# Patient Record
Sex: FEMALE | Race: WHITE | NOT HISPANIC OR LATINO | Employment: UNEMPLOYED | URBAN - METROPOLITAN AREA
[De-identification: names, ages, dates, MRNs, and addresses within clinical notes are randomized per-mention and may not be internally consistent; named-entity substitution may affect disease eponyms.]

---

## 2017-02-20 ENCOUNTER — OFFICE VISIT (OUTPATIENT)
Dept: OBSTETRICS AND GYNECOLOGY | Facility: CLINIC | Age: 32
End: 2017-02-20

## 2017-02-20 VITALS — SYSTOLIC BLOOD PRESSURE: 120 MMHG | DIASTOLIC BLOOD PRESSURE: 80 MMHG | WEIGHT: 179 LBS

## 2017-02-20 DIAGNOSIS — R30.0 DYSURIA: ICD-10-CM

## 2017-02-20 DIAGNOSIS — N94.6 DYSMENORRHEA: ICD-10-CM

## 2017-02-20 DIAGNOSIS — N89.8 VAGINAL DISCHARGE: ICD-10-CM

## 2017-02-20 DIAGNOSIS — N92.1 MENORRHAGIA WITH IRREGULAR CYCLE: ICD-10-CM

## 2017-02-20 DIAGNOSIS — N92.6 IRREGULAR PERIODS: Primary | ICD-10-CM

## 2017-02-20 LAB
BILIRUB UR QL STRIP: NEGATIVE
CLARITY UR: CLEAR
COLOR UR: YELLOW
GLUCOSE UR STRIP-MCNC: NEGATIVE MG/DL
HGB UR QL STRIP.AUTO: NEGATIVE
KETONES UR QL STRIP: NEGATIVE
LEUKOCYTE ESTERASE UR QL STRIP.AUTO: NEGATIVE
NITRITE UR QL STRIP: NEGATIVE
PH UR STRIP.AUTO: 7 [PH] (ref 5–8)
PROT UR QL STRIP: NEGATIVE
SP GR UR STRIP: 1.02 (ref 1–1.03)
UROBILINOGEN UR QL STRIP: NORMAL
WET PREP GENITAL: NEGATIVE

## 2017-02-20 PROCEDURE — 81003 URINALYSIS AUTO W/O SCOPE: CPT | Performed by: OBSTETRICS & GYNECOLOGY

## 2017-02-20 PROCEDURE — 87210 SMEAR WET MOUNT SALINE/INK: CPT | Performed by: OBSTETRICS & GYNECOLOGY

## 2017-02-20 PROCEDURE — 99213 OFFICE O/P EST LOW 20 MIN: CPT | Performed by: OBSTETRICS & GYNECOLOGY

## 2017-02-20 RX ORDER — NORETHINDRONE ACETATE AND ETHINYL ESTRADIOL 1.5-30(21)
1 KIT ORAL DAILY
Qty: 28 TABLET | Refills: 12 | Status: SHIPPED | OUTPATIENT
Start: 2017-02-20 | End: 2017-11-10 | Stop reason: ALTCHOICE

## 2017-02-20 NOTE — PROGRESS NOTES
Subjective   Doris Rock is a 31 y.o. female.     History of Present Illness    PP BTL Sept 2011, abnormal periods since March 2016, D/C may 2016, no improvement  The following portions of the patient's history were reviewed and updated as appropriate: allergies, current medications, past family history, past medical history, past social history, past surgical history and problem list.    Review of Systems   Constitutional: Negative.    Gastrointestinal: Negative.    Genitourinary: Positive for dysuria, frequency, menstrual problem, pelvic pain, urgency, vaginal bleeding and vaginal discharge. Negative for decreased urine volume, difficulty urinating, dyspareunia, enuresis, flank pain, hematuria and vaginal pain.   Musculoskeletal: Negative.    Neurological: Negative.    Psychiatric/Behavioral: The patient is nervous/anxious.        Objective   Physical Exam   Abdominal: Soft. Bowel sounds are normal.   Genitourinary: Pelvic exam was performed with patient supine. No labial fusion. There is no rash, tenderness, lesion or injury on the right labia. There is no rash, tenderness, lesion or injury on the left labia. Uterus is tender. Uterus is not deviated, not enlarged and not fixed. Cervix exhibits no motion tenderness, no discharge and no friability. Right adnexum displays no mass, no tenderness and no fullness. Left adnexum displays no mass, no tenderness and no fullness. No erythema, tenderness or bleeding in the vagina. No foreign body in the vagina. No signs of injury around the vagina. Vaginal discharge found.       Nursing note and vitals reviewed.      Assessment/Plan   Doris was seen today for gynecologic exam.    Diagnoses and all orders for this visit:    Irregular periods  -     norethindrone-ethinyl estradiol-iron (MICROGESTIN FE1.5/30) 1.5-30 MG-MCG tablet; Take 1 tablet by mouth Daily.    Menorrhagia with irregular cycle    Dysmenorrhea  -     norethindrone-ethinyl estradiol-iron (MICROGESTIN FE1.5/30)  1.5-30 MG-MCG tablet; Take 1 tablet by mouth Daily.    Vaginal discharge  -     POC Wet Prep    Dysuria  -     Urinalysis With / Culture If Indicated         Return 3 months    Danny Saeed MD

## 2017-06-02 ENCOUNTER — OFFICE VISIT (OUTPATIENT)
Dept: OBSTETRICS AND GYNECOLOGY | Facility: CLINIC | Age: 32
End: 2017-06-02

## 2017-06-02 VITALS
HEIGHT: 67 IN | DIASTOLIC BLOOD PRESSURE: 70 MMHG | WEIGHT: 172 LBS | SYSTOLIC BLOOD PRESSURE: 120 MMHG | BODY MASS INDEX: 27 KG/M2

## 2017-06-02 DIAGNOSIS — N92.1 MENORRHAGIA WITH IRREGULAR CYCLE: ICD-10-CM

## 2017-06-02 DIAGNOSIS — N94.6 DYSMENORRHEA: ICD-10-CM

## 2017-06-02 DIAGNOSIS — R10.2 PELVIC PAIN: ICD-10-CM

## 2017-06-02 DIAGNOSIS — N92.6 IRREGULAR PERIODS: Primary | ICD-10-CM

## 2017-06-02 PROCEDURE — 99212 OFFICE O/P EST SF 10 MIN: CPT | Performed by: OBSTETRICS & GYNECOLOGY

## 2017-06-02 NOTE — PROGRESS NOTES
Subjective   Doris Rock is a 31 y.o. female.     History of Present Illness   Symptoms did not improve with BCP's, pain continues, constant pain , periods are very heavy, BTL for BC  The following portions of the patient's history were reviewed and updated as appropriate: allergies, current medications, past family history, past medical history, past social history, past surgical history and problem list.    Review of Systems   Constitutional: Negative.    Gastrointestinal: Positive for abdominal pain.   Genitourinary: Positive for dyspareunia, menstrual problem, pelvic pain and vaginal bleeding.   Psychiatric/Behavioral: The patient is nervous/anxious.        Objective   Physical Exam   Abdominal: Soft. Bowel sounds are normal. She exhibits no distension. There is no tenderness. There is no rebound and no guarding. No hernia.       Assessment/Plan   Doris was seen today for gynecologic exam and pelvic pain.    Diagnoses and all orders for this visit:    Irregular periods    Menorrhagia with irregular cycle    Dysmenorrhea    Pelvic pain       Refer to Dr Almaraz for evaluation possible laparoscopy, ablation    Danny Saeed MD

## 2017-10-17 ENCOUNTER — APPOINTMENT (OUTPATIENT)
Dept: CT IMAGING | Facility: HOSPITAL | Age: 32
End: 2017-10-17

## 2017-10-17 ENCOUNTER — HOSPITAL ENCOUNTER (EMERGENCY)
Facility: HOSPITAL | Age: 32
Discharge: HOME | End: 2017-10-17
Attending: EMERGENCY MEDICINE | Admitting: EMERGENCY MEDICINE

## 2017-10-17 VITALS
DIASTOLIC BLOOD PRESSURE: 89 MMHG | BODY MASS INDEX: 28.93 KG/M2 | HEIGHT: 66 IN | SYSTOLIC BLOOD PRESSURE: 150 MMHG | WEIGHT: 180 LBS | HEART RATE: 75 BPM | RESPIRATION RATE: 18 BRPM | OXYGEN SATURATION: 100 % | TEMPERATURE: 99.4 F

## 2017-10-17 DIAGNOSIS — N93.8 DYSFUNCTIONAL UTERINE BLEEDING: ICD-10-CM

## 2017-10-17 DIAGNOSIS — N83.209 CYST OF OVARY, UNSPECIFIED LATERALITY: Primary | ICD-10-CM

## 2017-10-17 LAB
ALBUMIN SERPL-MCNC: 4.2 G/DL (ref 3.2–4.8)
ALBUMIN/GLOB SERPL: 1.2 G/DL (ref 1.5–2.5)
ALP SERPL-CCNC: 46 U/L (ref 25–100)
ALT SERPL W P-5'-P-CCNC: 63 U/L (ref 7–40)
ANION GAP SERPL CALCULATED.3IONS-SCNC: 4 MMOL/L (ref 3–11)
AST SERPL-CCNC: 42 U/L (ref 0–33)
B-HCG UR QL: NEGATIVE
BASOPHILS # BLD AUTO: 0.01 10*3/MM3 (ref 0–0.2)
BASOPHILS NFR BLD AUTO: 0.2 % (ref 0–1)
BILIRUB SERPL-MCNC: 0.4 MG/DL (ref 0.3–1.2)
BILIRUB UR QL STRIP: NEGATIVE
BUN BLD-MCNC: 9 MG/DL (ref 9–23)
BUN/CREAT SERPL: 12.9 (ref 7–25)
CALCIUM SPEC-SCNC: 9.2 MG/DL (ref 8.7–10.4)
CHLORIDE SERPL-SCNC: 107 MMOL/L (ref 99–109)
CLARITY UR: CLEAR
CO2 SERPL-SCNC: 26 MMOL/L (ref 20–31)
COLOR UR: YELLOW
CREAT BLD-MCNC: 0.7 MG/DL (ref 0.6–1.3)
DEPRECATED RDW RBC AUTO: 40.4 FL (ref 37–54)
EOSINOPHIL # BLD AUTO: 0.05 10*3/MM3 (ref 0–0.3)
EOSINOPHIL NFR BLD AUTO: 1 % (ref 0–3)
ERYTHROCYTE [DISTWIDTH] IN BLOOD BY AUTOMATED COUNT: 12.4 % (ref 11.3–14.5)
GFR SERPL CREATININE-BSD FRML MDRD: 98 ML/MIN/1.73
GLOBULIN UR ELPH-MCNC: 3.5 GM/DL
GLUCOSE BLD-MCNC: 102 MG/DL (ref 70–100)
GLUCOSE UR STRIP-MCNC: NEGATIVE MG/DL
HCT VFR BLD AUTO: 39.3 % (ref 34.5–44)
HGB BLD-MCNC: 12.9 G/DL (ref 11.5–15.5)
HGB UR QL STRIP.AUTO: NEGATIVE
IMM GRANULOCYTES # BLD: 0.01 10*3/MM3 (ref 0–0.03)
IMM GRANULOCYTES NFR BLD: 0.2 % (ref 0–0.6)
INTERNAL NEGATIVE CONTROL: NEGATIVE
INTERNAL POSITIVE CONTROL: POSITIVE
KETONES UR QL STRIP: NEGATIVE
KOH PREP NAIL: NORMAL
LEUKOCYTE ESTERASE UR QL STRIP.AUTO: NEGATIVE
LYMPHOCYTES # BLD AUTO: 2.54 10*3/MM3 (ref 0.6–4.8)
LYMPHOCYTES NFR BLD AUTO: 48.9 % (ref 24–44)
Lab: NORMAL
MCH RBC QN AUTO: 29.3 PG (ref 27–31)
MCHC RBC AUTO-ENTMCNC: 32.8 G/DL (ref 32–36)
MCV RBC AUTO: 89.3 FL (ref 80–99)
MONOCYTES # BLD AUTO: 0.43 10*3/MM3 (ref 0–1)
MONOCYTES NFR BLD AUTO: 8.3 % (ref 0–12)
NEUTROPHILS # BLD AUTO: 2.15 10*3/MM3 (ref 1.5–8.3)
NEUTROPHILS NFR BLD AUTO: 41.4 % (ref 41–71)
NITRITE UR QL STRIP: NEGATIVE
PH UR STRIP.AUTO: 6 [PH] (ref 5–8)
PLATELET # BLD AUTO: 154 10*3/MM3 (ref 150–450)
PMV BLD AUTO: 10 FL (ref 6–12)
POTASSIUM BLD-SCNC: 3.5 MMOL/L (ref 3.5–5.5)
PROT SERPL-MCNC: 7.7 G/DL (ref 5.7–8.2)
PROT UR QL STRIP: NEGATIVE
RBC # BLD AUTO: 4.4 10*6/MM3 (ref 3.89–5.14)
SODIUM BLD-SCNC: 137 MMOL/L (ref 132–146)
SP GR UR STRIP: 1.01 (ref 1–1.03)
UROBILINOGEN UR QL STRIP: NORMAL
WBC NRBC COR # BLD: 5.19 10*3/MM3 (ref 3.5–10.8)

## 2017-10-17 PROCEDURE — 25010000002 ONDANSETRON PER 1 MG: Performed by: NURSE PRACTITIONER

## 2017-10-17 PROCEDURE — 96374 THER/PROPH/DIAG INJ IV PUSH: CPT

## 2017-10-17 PROCEDURE — 99283 EMERGENCY DEPT VISIT LOW MDM: CPT

## 2017-10-17 PROCEDURE — 0 IOPAMIDOL 61 % SOLUTION: Performed by: EMERGENCY MEDICINE

## 2017-10-17 PROCEDURE — 87591 N.GONORRHOEAE DNA AMP PROB: CPT | Performed by: NURSE PRACTITIONER

## 2017-10-17 PROCEDURE — P9612 CATHETERIZE FOR URINE SPEC: HCPCS

## 2017-10-17 PROCEDURE — 80053 COMPREHEN METABOLIC PANEL: CPT | Performed by: NURSE PRACTITIONER

## 2017-10-17 PROCEDURE — 85025 COMPLETE CBC W/AUTO DIFF WBC: CPT | Performed by: NURSE PRACTITIONER

## 2017-10-17 PROCEDURE — 87220 TISSUE EXAM FOR FUNGI: CPT | Performed by: NURSE PRACTITIONER

## 2017-10-17 PROCEDURE — 74177 CT ABD & PELVIS W/CONTRAST: CPT

## 2017-10-17 PROCEDURE — 96361 HYDRATE IV INFUSION ADD-ON: CPT

## 2017-10-17 PROCEDURE — 87491 CHLMYD TRACH DNA AMP PROBE: CPT | Performed by: NURSE PRACTITIONER

## 2017-10-17 PROCEDURE — 81003 URINALYSIS AUTO W/O SCOPE: CPT | Performed by: NURSE PRACTITIONER

## 2017-10-17 RX ORDER — ONDANSETRON 2 MG/ML
4 INJECTION INTRAMUSCULAR; INTRAVENOUS ONCE
Status: COMPLETED | OUTPATIENT
Start: 2017-10-17 | End: 2017-10-17

## 2017-10-17 RX ORDER — SODIUM CHLORIDE 0.9 % (FLUSH) 0.9 %
10 SYRINGE (ML) INJECTION AS NEEDED
Status: DISCONTINUED | OUTPATIENT
Start: 2017-10-17 | End: 2017-10-17 | Stop reason: HOSPADM

## 2017-10-17 RX ORDER — IBUPROFEN 800 MG/1
800 TABLET ORAL
Qty: 30 TABLET | Refills: 0 | Status: SHIPPED | OUTPATIENT
Start: 2017-10-17

## 2017-10-17 RX ADMIN — IOPAMIDOL 95 ML: 612 INJECTION, SOLUTION INTRAVENOUS at 19:42

## 2017-10-17 RX ADMIN — ONDANSETRON 4 MG: 2 INJECTION INTRAMUSCULAR; INTRAVENOUS at 19:19

## 2017-10-17 RX ADMIN — SODIUM CHLORIDE 1000 ML: 9 INJECTION, SOLUTION INTRAVENOUS at 19:20

## 2017-10-17 NOTE — ED PROVIDER NOTES
Subjective   HPI Comments: Pt  is a 32-year-old white female that presents emergency Department complaints of vaginal bleeding.  Patient reports that she's had bleeding past 3 days as well as abdominal pain.  Patient rates her pain an 8 out of 10.  Patient complains of nausea, vomiting.  Patient reports she's gone through one pad per hour.  Patient advises that her periods have been irregular since her tubal ligation 6 years ago.  Patient denies any fevers, chills.    Patient is a 32 y.o. female presenting with vaginal bleeding.   History provided by:  Patient  Vaginal Bleeding   Quality:  Bright red  Severity:  Moderate  Timing:  Constant  Progression:  Worsening  Menstrual history:  Irregular  Number of pads used:  1 per hr  Relieved by:  Nothing  Worsened by:  Nothing  Associated symptoms: abdominal pain and nausea    Associated symptoms: no back pain, no dizziness and no fever        Review of Systems   Constitutional: Negative for chills and fever.   Respiratory: Negative for cough and shortness of breath.    Cardiovascular: Negative for chest pain.   Gastrointestinal: Positive for abdominal pain and nausea.   Genitourinary: Positive for vaginal bleeding.   Musculoskeletal: Negative for back pain.   Neurological: Negative for dizziness.   All other systems reviewed and are negative.      History reviewed. No pertinent past medical history.    No Known Allergies    Past Surgical History:   Procedure Laterality Date   • CHOLECYSTECTOMY     • TUBAL ABDOMINAL LIGATION         Family History   Problem Relation Age of Onset   • Brain cancer Father    • No Known Problems Mother    • Ovarian cancer Sister    • Breast cancer Maternal Grandmother    • Colon cancer Neg Hx    • Diabetes Neg Hx        Social History     Social History   • Marital status: Single     Spouse name: N/A   • Number of children: N/A   • Years of education: N/A     Social History Main Topics   • Smoking status: Current Every Day Smoker      Packs/day: 0.50   • Smokeless tobacco: None   • Alcohol use No   • Drug use: Yes     Special: Marijuana   • Sexual activity: Yes     Birth control/ protection: None     Other Topics Concern   • None     Social History Narrative           Objective   Physical Exam   Constitutional: She is oriented to person, place, and time. She appears well-developed and well-nourished. No distress.   HENT:   Head: Normocephalic and atraumatic.   Right Ear: External ear normal.   Left Ear: External ear normal.   Mouth/Throat: Oropharynx is clear and moist.   Eyes: Conjunctivae and EOM are normal.   Neck: Normal range of motion. Neck supple.   Cardiovascular: Normal rate, regular rhythm and normal heart sounds.    Pulmonary/Chest: Effort normal and breath sounds normal. No respiratory distress.   Abdominal: Soft. Bowel sounds are normal. She exhibits no distension. There is tenderness (lower abd).   Genitourinary: Pelvic exam was performed with patient prone. Cervix exhibits no motion tenderness. Right adnexum displays no mass, no tenderness and no fullness. Left adnexum displays no mass, no tenderness and no fullness. There is bleeding in the vagina.   Genitourinary Comments: Chaperone present   Musculoskeletal: Normal range of motion. She exhibits no edema.   Neurological: She is alert and oriented to person, place, and time. No cranial nerve deficit.   Skin: Skin is warm and dry.   Psychiatric: She has a normal mood and affect. Her behavior is normal.   Nursing note and vitals reviewed.      Procedures         ED Course  ED Course   Comment By Time   She does advise her results at this time.  Patient will be discharged home.  Patient to take ibuprofen for pain and inflammation.  Patient to follow up with GYN.  Patient agrees and verbalizes understanding. Luz Short, APRN 10/17 2140        Recent Results (from the past 24 hour(s))   Comprehensive Metabolic Panel    Collection Time: 10/17/17  6:27 PM   Result Value Ref Range     Glucose 102 (H) 70 - 100 mg/dL    BUN 9 9 - 23 mg/dL    Creatinine 0.70 0.60 - 1.30 mg/dL    Sodium 137 132 - 146 mmol/L    Potassium 3.5 3.5 - 5.5 mmol/L    Chloride 107 99 - 109 mmol/L    CO2 26.0 20.0 - 31.0 mmol/L    Calcium 9.2 8.7 - 10.4 mg/dL    Total Protein 7.7 5.7 - 8.2 g/dL    Albumin 4.20 3.20 - 4.80 g/dL    ALT (SGPT) 63 (H) 7 - 40 U/L    AST (SGOT) 42 (H) 0 - 33 U/L    Alkaline Phosphatase 46 25 - 100 U/L    Total Bilirubin 0.4 0.3 - 1.2 mg/dL    eGFR Non African Amer 98 >60 mL/min/1.73    Globulin 3.5 gm/dL    A/G Ratio 1.2 (L) 1.5 - 2.5 g/dL    BUN/Creatinine Ratio 12.9 7.0 - 25.0    Anion Gap 4.0 3.0 - 11.0 mmol/L   CBC Auto Differential    Collection Time: 10/17/17  6:27 PM   Result Value Ref Range    WBC 5.19 3.50 - 10.80 10*3/mm3    RBC 4.40 3.89 - 5.14 10*6/mm3    Hemoglobin 12.9 11.5 - 15.5 g/dL    Hematocrit 39.3 34.5 - 44.0 %    MCV 89.3 80.0 - 99.0 fL    MCH 29.3 27.0 - 31.0 pg    MCHC 32.8 32.0 - 36.0 g/dL    RDW 12.4 11.3 - 14.5 %    RDW-SD 40.4 37.0 - 54.0 fl    MPV 10.0 6.0 - 12.0 fL    Platelets 154 150 - 450 10*3/mm3    Neutrophil % 41.4 41.0 - 71.0 %    Lymphocyte % 48.9 (H) 24.0 - 44.0 %    Monocyte % 8.3 0.0 - 12.0 %    Eosinophil % 1.0 0.0 - 3.0 %    Basophil % 0.2 0.0 - 1.0 %    Immature Grans % 0.2 0.0 - 0.6 %    Neutrophils, Absolute 2.15 1.50 - 8.30 10*3/mm3    Lymphocytes, Absolute 2.54 0.60 - 4.80 10*3/mm3    Monocytes, Absolute 0.43 0.00 - 1.00 10*3/mm3    Eosinophils, Absolute 0.05 0.00 - 0.30 10*3/mm3    Basophils, Absolute 0.01 0.00 - 0.20 10*3/mm3    Immature Grans, Absolute 0.01 0.00 - 0.03 10*3/mm3   Urinalysis With / Culture If Indicated - Urine, Catheter    Collection Time: 10/17/17  7:29 PM   Result Value Ref Range    Color, UA Yellow Yellow, Straw    Appearance, UA Clear Clear    pH, UA 6.0 5.0 - 8.0    Specific Gravity, UA 1.014 1.001 - 1.030    Glucose, UA Negative Negative    Ketones, UA Negative Negative    Bilirubin, UA Negative Negative    Blood, UA  "Negative Negative    Protein, UA Negative Negative    Leuk Esterase, UA Negative Negative    Nitrite, UA Negative Negative    Urobilinogen, UA 1.0 E.U./dL 0.2 - 1.0 E.U./dL   POCT Pregnancy, urine    Collection Time: 10/17/17  7:34 PM   Result Value Ref Range    HCG, Urine, QL Negative Negative    Lot Number [id number]     Internal Positive Control Positive     Internal Negative Control Negative    KOH Prep - Swab, Cervix    Collection Time: 10/17/17  9:13 PM   Result Value Ref Range    KOH Prep No yeast or hyphal elements seen No yeast or hyphal elements seen     Note: In addition to lab results from this visit, the labs listed above may include labs taken at another facility or during a different encounter within the last 24 hours. Please correlate lab times with ED admission and discharge times for further clarification of the services performed during this visit.    CT Abdomen Pelvis With Contrast   Final Result     Uterus demonstrates mildly prominent endometrial hypodensity, ovarian    cysts/follicles.   Followup with sonography may be considered if clinically    indicated.        Likely apparent wall thickening of nondistended stomach although possibility    of gastritis/peptic ulcer disease cannot be excluded.         Other nonemergent findings as described.          THIS DOCUMENT HAS BEEN ELECTRONICALLY SIGNED BY DARLIN KRUGER MD        Vitals:    10/17/17 1805 10/17/17 1815 10/17/17 1934 10/17/17 2040   BP: 152/86 150/89     BP Location: Left arm      Patient Position: Sitting      Pulse: 75      Resp: 18      Temp: 99.4 °F (37.4 °C)      TempSrc: Oral      SpO2: 100% 100% 100% 100%   Weight: 180 lb (81.6 kg)      Height: 66\" (167.6 cm)        Medications   sodium chloride 0.9 % flush 10 mL (not administered)   sodium chloride 0.9 % bolus 1,000 mL (0 mL Intravenous Stopped 10/17/17 2045)   ondansetron (ZOFRAN) injection 4 mg (4 mg Intravenous Given 10/17/17 1919)   iopamidol (ISOVUE-300) 61 % injection 100 " mL (95 mL Intravenous Given 10/17/17 1942)     ECG/EMG Results (last 24 hours)     ** No results found for the last 24 hours. **                  [facility]    Final diagnoses:   Cyst of ovary, unspecified laterality   Dysfunctional uterine bleeding            Luz LINDA Short, APRN  10/17/17 9919

## 2017-10-20 LAB
C TRACH RRNA SPEC DONR QL NAA+PROBE: NEGATIVE
N GONORRHOEA DNA SPEC QL NAA+PROBE: NEGATIVE

## 2017-11-01 ENCOUNTER — OFFICE VISIT (OUTPATIENT)
Dept: OBSTETRICS AND GYNECOLOGY | Facility: CLINIC | Age: 32
End: 2017-11-01

## 2017-11-01 VITALS
BODY MASS INDEX: 32.44 KG/M2 | SYSTOLIC BLOOD PRESSURE: 120 MMHG | WEIGHT: 201 LBS | HEART RATE: 68 BPM | DIASTOLIC BLOOD PRESSURE: 80 MMHG

## 2017-11-01 DIAGNOSIS — R10.2 PELVIC PAIN: Primary | ICD-10-CM

## 2017-11-01 PROCEDURE — 99213 OFFICE O/P EST LOW 20 MIN: CPT | Performed by: OBSTETRICS & GYNECOLOGY

## 2017-11-01 NOTE — PROGRESS NOTES
Subjective   Doris Rock is a 32 y.o. female.     History of Present Illness  Patient is seen again for abdominal pain and was worked up in the emergency room. CT was inclusive. Patient was scheduled to see Dr Almaraz but did not keep appointment.  Patient reports having chills, nausea vomiting, diarrhea, frequency, and burning on urination.  UA in the ED was normal.      The following portions of the patient's history were reviewed and updated as appropriate: allergies, current medications, past family history, past medical history, past social history, past surgical history and problem list.    Review of Systems   Constitutional: Positive for chills.   Gastrointestinal: Positive for abdominal pain, diarrhea, nausea and vomiting. Negative for abdominal distention, anal bleeding, blood in stool, constipation and rectal pain.   Genitourinary: Positive for difficulty urinating, dyspareunia, dysuria, frequency, pelvic pain and urgency. Negative for decreased urine volume, hematuria, vaginal bleeding, vaginal discharge and vaginal pain.   Psychiatric/Behavioral: The patient is nervous/anxious.        Objective   Physical Exam   Constitutional: She appears well-developed and well-nourished.   Abdominal: Soft. Bowel sounds are normal. She exhibits no distension and no mass. There is no tenderness. There is no rebound and no guarding. No hernia.   Genitourinary: Pelvic exam was performed with patient supine. No labial fusion. There is no rash, tenderness, lesion or injury on the right labia. There is no rash, tenderness, lesion or injury on the left labia. Uterus is not deviated, not enlarged, not fixed and not tender. Cervix exhibits no motion tenderness, no discharge and no friability. Right adnexum displays no mass, no tenderness and no fullness. Left adnexum displays no mass, no tenderness and no fullness. No erythema, tenderness or bleeding in the vagina. No foreign body in the vagina. No signs of injury around the  vagina. Vaginal discharge found.   Nursing note and vitals reviewed.      Assessment/Plan   Doris was seen today for gynecologic exam.    Diagnoses and all orders for this visit:    Pelvic pain  -     US Non-ob Transvaginal       Refer to Dr. Julián Saeed MD

## 2017-11-10 ENCOUNTER — OFFICE VISIT (OUTPATIENT)
Dept: OBSTETRICS AND GYNECOLOGY | Facility: CLINIC | Age: 32
End: 2017-11-10

## 2017-11-10 VITALS
DIASTOLIC BLOOD PRESSURE: 78 MMHG | HEART RATE: 73 BPM | OXYGEN SATURATION: 98 % | BODY MASS INDEX: 31.83 KG/M2 | RESPIRATION RATE: 16 BRPM | WEIGHT: 197.2 LBS | SYSTOLIC BLOOD PRESSURE: 110 MMHG

## 2017-11-10 DIAGNOSIS — R10.2 CHRONIC PELVIC PAIN IN FEMALE: Primary | ICD-10-CM

## 2017-11-10 DIAGNOSIS — R30.9 PAIN WITH URINATION: ICD-10-CM

## 2017-11-10 DIAGNOSIS — G89.29 CHRONIC PELVIC PAIN IN FEMALE: Primary | ICD-10-CM

## 2017-11-10 LAB
BILIRUB BLD-MCNC: ABNORMAL MG/DL
CLARITY, POC: ABNORMAL
COLOR UR: YELLOW
GLUCOSE UR STRIP-MCNC: NEGATIVE MG/DL
KETONES UR QL: ABNORMAL
LEUKOCYTE EST, POC: ABNORMAL
NITRITE UR-MCNC: NEGATIVE MG/ML
PH UR: 5 [PH] (ref 5–8)
PROT UR STRIP-MCNC: NEGATIVE MG/DL
RBC # UR STRIP: NEGATIVE /UL
SP GR UR: 1.02 (ref 1–1.03)
UROBILINOGEN UR QL: ABNORMAL

## 2017-11-10 PROCEDURE — 99214 OFFICE O/P EST MOD 30 MIN: CPT | Performed by: OBSTETRICS & GYNECOLOGY

## 2017-11-10 PROCEDURE — 87086 URINE CULTURE/COLONY COUNT: CPT | Performed by: OBSTETRICS & GYNECOLOGY

## 2017-11-10 NOTE — PROGRESS NOTES
Subjective   Chief Complaint   Patient presents with   • Follow-up     Severe stomach pain 8/10 mainly when it's time for menses x 4-5 months     Doris Rock is a 32 y.o. year old .  Patient's last menstrual period was 10/12/2017 (approximate).  She presents to be seen because of Chronic pelvic pain.  Patient describes the pain as having lasted for approximately 8 months.  It is in a bandlike distribution across the bottom of her abdomen and slightly worse on the left than the right.  She states that the pain is mostly during menses and several days before and after her bleeding.  She also notes dyspareunia with deep penetration.  She has never had issues like this in the past and she denies any alleviating or exacerbating factors outside of menses    OTHER COMPLAINTS:  Nothing else    The following portions of the patient's history were reviewed and updated as appropriate:current medications and allergies    Smoking status: Current Every Day Smoker                                                   Packs/day: 0.50      Years: 0.00      Smokeless status: Never Used                        Review of Systems  Constitutional POS: nothing reported    NEG: anorexia or night sweats   Gastointestinal POS: nothing reported    NEG: bloating, change in bowel habits, melena or reflux symptoms   Genitourinary POS: see HPI    NEG: dysuria or hematuria   Integument POS: nothing reported    NEG: moles that are changing in size, shape, color or rashes   Breast POS: nothing reported    NEG: persistent breast lump, skin dimpling or nipple discharge         Objective   /78  Pulse 73  Resp 16  Wt 197 lb 3.2 oz (89.4 kg)  LMP 10/12/2017 (Approximate)  SpO2 98%  Breastfeeding? No  BMI 31.83 kg/m2    General:  well developed; well nourished  no acute distress   Skin:  No suspicious lesions seen   Thyroid: not examined   Lungs:  breathing is unlabored   Heart:  Regular rate and rhythm    Breasts:  Not performed.    Abdomen: soft, non-tender; no masses  no umbilical or inginual hernias are present  no hepato-splenomegaly   Pelvis: Clinical staff was present for exam  External genitalia:  normal appearance of the external genitalia including Bartholin's and Agricola's glands.  :  urethral meatus normal;  Vaginal:  normal pink mucosa without prolapse or lesions.  Cervix:  cervical motion tenderness is present;  Uterus:  normal size, shape and consistency. tenderness to palpation is present;  Adnexa:  tenderness of the bilateral adnexa to  superficial and deep palpation;     Lab Review   No data reviewed    Imaging   No data reviewed        Assessment   1. Chronic pelvic pain     Plan   1. Given patient's symptoms are highly suspicious for endometriosis, options for evaluation were given.  Patient opts for diagnostic laparoscopy with possible fulguration of endometriosis implants.  Will schedule.      No orders of the defined types were placed in this encounter.         This note was electronically signed.    Nahum Gallego M.D. YUKO  November 10, 2017

## 2017-11-15 LAB — BACTERIA SPEC AEROBE CULT: NORMAL

## 2017-11-16 ENCOUNTER — OUTSIDE FACILITY SERVICE (OUTPATIENT)
Dept: OBSTETRICS AND GYNECOLOGY | Facility: CLINIC | Age: 32
End: 2017-11-16

## 2017-11-16 PROCEDURE — 58662 LAPAROSCOPY EXCISE LESIONS: CPT | Performed by: OBSTETRICS & GYNECOLOGY

## 2017-12-04 ENCOUNTER — TELEPHONE (OUTPATIENT)
Dept: OBSTETRICS AND GYNECOLOGY | Facility: CLINIC | Age: 32
End: 2017-12-04

## 2017-12-04 NOTE — TELEPHONE ENCOUNTER
Patient was advised that she needs to contact PCP as soon as we hang up to get an appointment, or she needs to see [facility] for evaluation of chest pain.  I told her that this should not be related to her recent surgery, and it was an urgent problem that needed to be addressed today.  She voiced understanding and stated that she would call PCP when our call ended.

## 2017-12-04 NOTE — TELEPHONE ENCOUNTER
Dr Gallego pt had laparoscoy recently and twice since she has had a feeling in chest that is a really bad pain, and feels like chest gets tight, cold sweat.  It just happened for the second time this morning.  Comes on suddenly, stopped taking the pain medicine about a week after her surgery, she thought it was that , until this morning it happened again.  No history of heart issues.

## 2022-07-26 ENCOUNTER — OFFICE VISIT (OUTPATIENT)
Dept: PSYCHIATRY | Facility: CLINIC | Age: 37
End: 2022-07-26

## 2022-07-26 DIAGNOSIS — F43.22 ADJUSTMENT DISORDER WITH ANXIOUS MOOD: Primary | ICD-10-CM

## 2022-07-26 PROCEDURE — 90785 PSYTX COMPLEX INTERACTIVE: CPT | Performed by: COUNSELOR

## 2022-07-26 PROCEDURE — 90791 PSYCH DIAGNOSTIC EVALUATION: CPT | Performed by: COUNSELOR
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
## 2022-10-05 ENCOUNTER — HOSPITAL ENCOUNTER (EMERGENCY)
Facility: HOSPITAL | Age: 37
Discharge: HOME | End: 2022-10-05
Attending: EMERGENCY MEDICINE | Admitting: EMERGENCY MEDICINE

## 2022-10-05 VITALS
WEIGHT: 140 LBS | SYSTOLIC BLOOD PRESSURE: 174 MMHG | BODY MASS INDEX: 22.5 KG/M2 | HEART RATE: 89 BPM | HEIGHT: 66 IN | TEMPERATURE: 97.7 F | OXYGEN SATURATION: 100 % | RESPIRATION RATE: 16 BRPM | DIASTOLIC BLOOD PRESSURE: 113 MMHG

## 2022-10-05 DIAGNOSIS — G56.03 BILATERAL CARPAL TUNNEL SYNDROME: Primary | ICD-10-CM

## 2022-10-05 PROCEDURE — 25010000002 KETOROLAC TROMETHAMINE PER 15 MG: Performed by: PHYSICIAN ASSISTANT

## 2022-10-05 PROCEDURE — 99283 EMERGENCY DEPT VISIT LOW MDM: CPT

## 2022-10-05 PROCEDURE — 96372 THER/PROPH/DIAG INJ SC/IM: CPT

## 2022-10-05 RX ORDER — KETOROLAC TROMETHAMINE 30 MG/ML
60 INJECTION, SOLUTION INTRAMUSCULAR; INTRAVENOUS ONCE
Status: COMPLETED | OUTPATIENT
Start: 2022-10-05 | End: 2022-10-05

## 2022-10-05 RX ORDER — NAPROXEN 500 MG/1
500 TABLET ORAL EVERY 12 HOURS PRN
Qty: 30 TABLET | Refills: 0 | Status: SHIPPED | OUTPATIENT
Start: 2022-10-05

## 2022-10-05 RX ADMIN — KETOROLAC TROMETHAMINE 60 MG: 30 INJECTION, SOLUTION INTRAMUSCULAR at 10:00

## 2022-10-05 RX ADMIN — DICLOFENAC 4 G: 10 GEL TOPICAL at 10:16

## 2022-10-05 NOTE — DISCHARGE INSTRUCTIONS
Wear wrist braces is much as possible.  Rest, ice, and elevate the extremities at home.  Take naproxen as needed as prescribed.  Use Voltaren gel as needed as prescribed.  Take Tylenol as needed per directions on the package.  Follow-up with your PCP and orthopedic specialist for further outpatient evaluation if symptoms persist.  Return to the ER for new or worsening symptoms or acute concerns.

## 2022-10-05 NOTE — ED PROVIDER NOTES
Subjective   History of Present Illness   Patient is a 37-year-old female presenting to the ER with complaints of bilateral wrist pain and tingling.  Patient works as a  and states that she does use her hands a lot.  She denies any specific injury.  She states she is able to move the wrist but it is painful.  She states she has been taking Tylenol over-the-counter.  She denies additional medications or interventions prior to arrival.  She denies additional symptoms or complaints at this time.    Review of Systems   Musculoskeletal:        Bilateral wrist pain and paresthesias   All other systems reviewed and are negative.      No past medical history on file.    No Known Allergies    Past Surgical History:   Procedure Laterality Date   • CHOLECYSTECTOMY     • TUBAL ABDOMINAL LIGATION         Family History   Problem Relation Age of Onset   • Brain cancer Father    • No Known Problems Mother    • Ovarian cancer Sister    • Breast cancer Maternal Grandmother    • Colon cancer Neg Hx    • Diabetes Neg Hx        Social History     Socioeconomic History   • Marital status: Single   Tobacco Use   • Smoking status: Current Every Day Smoker     Packs/day: 0.50   • Smokeless tobacco: Never Used   Substance and Sexual Activity   • Alcohol use: No   • Drug use: Yes     Types: Marijuana   • Sexual activity: Yes     Birth control/protection: Surgical     Comment: Tubal           Objective   Physical Exam  Vitals and nursing note reviewed.   Constitutional:       General: She is not in acute distress.     Appearance: She is not toxic-appearing.   HENT:      Head: Normocephalic and atraumatic.      Right Ear: External ear normal.      Left Ear: External ear normal.      Nose: Nose normal.   Eyes:      Extraocular Movements: Extraocular movements intact.      Conjunctiva/sclera: Conjunctivae normal.   Cardiovascular:      Rate and Rhythm: Normal rate.   Pulmonary:      Effort: Pulmonary effort is normal. No respiratory  distress.   Abdominal:      General: There is no distension.      Palpations: Abdomen is soft.   Musculoskeletal:      Cervical back: Normal range of motion and neck supple.      Comments: Range of motion intact but painful, positive Tinel and Phalen's test, 2+ pulses, no signs of injury, sensation intact   Skin:     General: Skin is warm and dry.   Neurological:      General: No focal deficit present.      Mental Status: She is alert and oriented to person, place, and time.   Psychiatric:         Mood and Affect: Mood normal.         Behavior: Behavior normal.         Procedures           ED Course                                           MDM   Patient was evaluated in the ER for bilateral wrist pain and paresthesias with no known injury.  Patient is hypertensive but otherwise hemodynamically stable and nontoxic-appearing on exam.  No recent injury.  No signs of acute injury on exam.  Patient does have positive Phalen's and Tinel's test and presentation is consistent with carpal tunnel syndrome.  Given this, patient was given a shot of Toradol, ice pack, and bilateral wrist braces.  She was given Voltaren gel to use as needed.  Prescription was sent for naproxen.  RICE therapy was advised.  Patient was given information for Dr. Shen, orthopedic specialist, for further outpatient evaluation if symptoms persist.  Precautions were given for return to the ER for any new or worsening symptoms.    Final diagnoses:   Bilateral carpal tunnel syndrome       ED Disposition  ED Disposition     ED Disposition   Discharge    Condition   Stable    Comment   --             Danny Saeed MD  [address] [facility] [address]  [facility] [address] [phone]    Call   As needed    Danny Shen MD  [address] [address] [phone]    Schedule an appointment as soon as possible for a visit   for further outpatient evaluation of bilateral wrist pain and paresthesia    [facility]  Department  [address] [facility] [address] [phone]  Go to   As needed, If symptoms worsen         Medication List      New Prescriptions    naproxen 500 MG tablet  Commonly known as: NAPROSYN  Take 1 tablet by mouth Every 12 (Twelve) Hours As Needed for Mild Pain or Moderate Pain.           Where to Get Your Medications      [id number] - [address] AT [facility] & [address]  - [phone]  - [phone] [address] [address]    Phone: [phone]   · naproxen 500 MG tablet          Anna Lr PAMaryC  10/05/22 1006

## 2024-03-03 ENCOUNTER — APPOINTMENT (OUTPATIENT)
Dept: ULTRASOUND IMAGING | Facility: HOSPITAL | Age: 39
End: 2024-03-03
Payer: COMMERCIAL

## 2024-03-03 ENCOUNTER — HOSPITAL ENCOUNTER (EMERGENCY)
Facility: HOSPITAL | Age: 39
Discharge: HOME | End: 2024-03-03
Attending: EMERGENCY MEDICINE | Admitting: EMERGENCY MEDICINE
Payer: COMMERCIAL

## 2024-03-03 VITALS
SYSTOLIC BLOOD PRESSURE: 161 MMHG | HEIGHT: 67 IN | WEIGHT: 170 LBS | OXYGEN SATURATION: 100 % | HEART RATE: 70 BPM | RESPIRATION RATE: 18 BRPM | DIASTOLIC BLOOD PRESSURE: 96 MMHG | TEMPERATURE: 97.8 F | BODY MASS INDEX: 26.68 KG/M2

## 2024-03-03 DIAGNOSIS — N92.6 IRREGULAR MENSTRUAL BLEEDING: Primary | ICD-10-CM

## 2024-03-03 DIAGNOSIS — D25.9 UTERINE LEIOMYOMA, UNSPECIFIED LOCATION: ICD-10-CM

## 2024-03-03 LAB
ABO GROUP BLD: NORMAL
BASOPHILS # BLD AUTO: 0.02 10*3/MM3 (ref 0–0.2)
BASOPHILS NFR BLD AUTO: 0.6 % (ref 0–1.5)
DEPRECATED RDW RBC AUTO: 41.7 FL (ref 37–54)
EOSINOPHIL # BLD AUTO: 0.06 10*3/MM3 (ref 0–0.4)
EOSINOPHIL NFR BLD AUTO: 1.7 % (ref 0.3–6.2)
ERYTHROCYTE [DISTWIDTH] IN BLOOD BY AUTOMATED COUNT: 13.2 % (ref 12.3–15.4)
HCG INTACT+B SERPL-ACNC: <0.1 MIU/ML
HCT VFR BLD AUTO: 39.7 % (ref 34–46.6)
HGB BLD-MCNC: 12.7 G/DL (ref 12–15.9)
HOLD SPECIMEN: NORMAL
IMM GRANULOCYTES # BLD AUTO: 0.01 10*3/MM3 (ref 0–0.05)
IMM GRANULOCYTES NFR BLD AUTO: 0.3 % (ref 0–0.5)
LYMPHOCYTES # BLD AUTO: 1.11 10*3/MM3 (ref 0.7–3.1)
LYMPHOCYTES NFR BLD AUTO: 31.4 % (ref 19.6–45.3)
MCH RBC QN AUTO: 27.9 PG (ref 26.6–33)
MCHC RBC AUTO-ENTMCNC: 32 G/DL (ref 31.5–35.7)
MCV RBC AUTO: 87.1 FL (ref 79–97)
MONOCYTES # BLD AUTO: 0.3 10*3/MM3 (ref 0.1–0.9)
MONOCYTES NFR BLD AUTO: 8.5 % (ref 5–12)
NEUTROPHILS NFR BLD AUTO: 2.03 10*3/MM3 (ref 1.7–7)
NEUTROPHILS NFR BLD AUTO: 57.5 % (ref 42.7–76)
NRBC BLD AUTO-RTO: 0 /100 WBC (ref 0–0.2)
NUMBER OF DOSES: NORMAL
PLATELET # BLD AUTO: 194 10*3/MM3 (ref 140–450)
PMV BLD AUTO: 10.4 FL (ref 6–12)
RBC # BLD AUTO: 4.56 10*6/MM3 (ref 3.77–5.28)
RH BLD: POSITIVE
WBC NRBC COR # BLD AUTO: 3.53 10*3/MM3 (ref 3.4–10.8)
WHOLE BLOOD HOLD COAG: NORMAL
WHOLE BLOOD HOLD SPECIMEN: NORMAL

## 2024-03-03 PROCEDURE — 25010000002 ONDANSETRON PER 1 MG: Performed by: PHYSICIAN ASSISTANT

## 2024-03-03 PROCEDURE — 86901 BLOOD TYPING SEROLOGIC RH(D): CPT | Performed by: EMERGENCY MEDICINE

## 2024-03-03 PROCEDURE — 99284 EMERGENCY DEPT VISIT MOD MDM: CPT

## 2024-03-03 PROCEDURE — 85025 COMPLETE CBC W/AUTO DIFF WBC: CPT | Performed by: EMERGENCY MEDICINE

## 2024-03-03 PROCEDURE — 25810000003 SODIUM CHLORIDE 0.9 % SOLUTION: Performed by: PHYSICIAN ASSISTANT

## 2024-03-03 PROCEDURE — 76817 TRANSVAGINAL US OBSTETRIC: CPT

## 2024-03-03 PROCEDURE — 84702 CHORIONIC GONADOTROPIN TEST: CPT | Performed by: EMERGENCY MEDICINE

## 2024-03-03 PROCEDURE — 96374 THER/PROPH/DIAG INJ IV PUSH: CPT

## 2024-03-03 PROCEDURE — 86900 BLOOD TYPING SEROLOGIC ABO: CPT | Performed by: EMERGENCY MEDICINE

## 2024-03-03 RX ORDER — ONDANSETRON 2 MG/ML
4 INJECTION INTRAMUSCULAR; INTRAVENOUS ONCE
Status: COMPLETED | OUTPATIENT
Start: 2024-03-03 | End: 2024-03-03

## 2024-03-03 RX ORDER — SODIUM CHLORIDE 0.9 % (FLUSH) 0.9 %
10 SYRINGE (ML) INJECTION AS NEEDED
Status: DISCONTINUED | OUTPATIENT
Start: 2024-03-03 | End: 2024-03-03 | Stop reason: HOSPADM

## 2024-03-03 RX ADMIN — ONDANSETRON 4 MG: 2 INJECTION INTRAMUSCULAR; INTRAVENOUS at 09:46

## 2024-03-03 RX ADMIN — SODIUM CHLORIDE 1000 ML: 9 INJECTION, SOLUTION INTRAVENOUS at 09:46

## 2024-03-03 NOTE — ED PROVIDER NOTES
"Subjective   History of Present Illness  Ms. Rock is a 38-year-old female with no known health issues who presents to the emergency department with complaints of vaginal bleeding and a positive home pregnancy test.  The patient states that she took a home pregnancy test 3 days ago that was positive.  She began having some cramping last night and then began light spotting last night.  This morning, she states that her bleeding was much heavier and she was passing clots and \"chunks\" of tissue.  She is not using any pads or tampons but states that she has but folds of toilet paper in her panties to catch the bleeding.  She is having some nausea and has vomited a couple of times.  Pregnancy history is G5, P4, AB 0.  She has not seen an OB doctor during this pregnancy as of yet.  She has plans to see Dr. Dominique Ambrose.  She has had previous cholecystectomy.  No other known health issues.      Review of Systems   Constitutional:  Negative for chills and fever.   HENT:  Negative for sore throat.    Respiratory:  Negative for cough and shortness of breath.    Cardiovascular:  Negative for chest pain.   Gastrointestinal:  Positive for abdominal pain (crampy), nausea and vomiting.   Genitourinary:  Positive for vaginal bleeding. Negative for dysuria.   Musculoskeletal:  Negative for back pain.   Skin:  Negative for pallor.   Neurological:  Negative for light-headedness and headaches.   Hematological: Negative.    Psychiatric/Behavioral: Negative.         History reviewed. No pertinent past medical history.    No Known Allergies    Past Surgical History:   Procedure Laterality Date    CHOLECYSTECTOMY      TUBAL ABDOMINAL LIGATION         Family History   Problem Relation Age of Onset    Brain cancer Father     No Known Problems Mother     Ovarian cancer Sister     Breast cancer Maternal Grandmother     Colon cancer Neg Hx     Diabetes Neg Hx        Social History     Socioeconomic History    Marital status: Single   Tobacco Use " "   Smoking status: Every Day     Current packs/day: 0.50     Types: Cigarettes     Passive exposure: Never    Smokeless tobacco: Never   Vaping Use    Vaping status: Never Used   Substance and Sexual Activity    Alcohol use: No    Drug use: Yes     Types: Marijuana    Sexual activity: Yes     Birth control/protection: Surgical     Comment: Tubal           Objective   Physical Exam  Constitutional:       General: She is not in acute distress.     Appearance: Normal appearance. She is not ill-appearing or diaphoretic.   HENT:      Head: Normocephalic.      Nose: Nose normal.      Mouth/Throat:      Mouth: Mucous membranes are moist.   Eyes:      Conjunctiva/sclera: Conjunctivae normal.      Pupils: Pupils are equal, round, and reactive to light.   Cardiovascular:      Rate and Rhythm: Normal rate and regular rhythm.      Pulses: Normal pulses.   Pulmonary:      Effort: Pulmonary effort is normal. No respiratory distress.      Breath sounds: Normal breath sounds.   Abdominal:      Tenderness: There is no abdominal tenderness. There is no guarding.   Musculoskeletal:      Cervical back: Neck supple.      Right lower leg: No edema.      Left lower leg: No edema.   Skin:     General: Skin is warm and dry.      Coloration: Skin is not pale.   Neurological:      Mental Status: She is alert and oriented to person, place, and time.   Psychiatric:         Mood and Affect: Mood normal.         Procedures           ED Course      In summary, healthy 38-year-old female with positive home pregnancy test 3 days ago, presents with pelvic cramping and vaginal bleeding that began late last night, early this morning.  She states that she is passing clots and \"chunks\" of tissue.  She is not using any pads but has placed folds of toilet paper in her panties to catch the bleeding.  She has some nausea and has vomited a few times this morning.    MDM: Differential includes miscarriage, irregular menses, ectopic pregnancy, etc.    Labs and " quantitative hCG drawn.  Will send for transvaginal ultrasound.  Patient given fluids and Zofran.    Quantitative hCG is negative at less than 0.10.    White count is normal at 3.53.  Normal H&H of 12.7/39.7.    Transvaginal ultrasound:  1. No evidence of intrauterine pregnancy at this time. Recommend correlation with beta hCG levels.  2. Predominantly intramural fibroid within the anterior uterine body measuring 1.8 cm with abutment of the endometrial canal. This appears similar to the prior pelvic ultrasound from 2017.  3. Normal appearance of the ovaries.    I spoke with the patient about her workup.  It appears that she is not pregnant and this is likely irregular menstrual bleeding.  She has some fibroids.  I will plan to discharge her to take naproxen or Tylenol for her crampy pain.  I will refer her to follow-up with gynecology.                                            Medical Decision Making  Amount and/or Complexity of Data Reviewed  Labs: ordered.  Radiology: ordered.    Risk  Prescription drug management.        Final diagnoses:   Irregular menstrual bleeding   Uterine leiomyoma, unspecified location       ED Disposition  ED Disposition       ED Disposition   Discharge    Condition   Stable    Comment   --               Mellisa Ambrose MD  [address] [address]  [phone]      call for follow up    [facility] EMERGENCY DEPARTMENT  [address] [facility] [address] [phone]    If symptoms worsen         Medication List      No changes were made to your prescriptions during this visit.            William Fofana PA  03/03/24 1140

## 2024-03-03 NOTE — DISCHARGE INSTRUCTIONS
Rest.  Plenty of fluids.  Tylenol or Motrin as directed for crampy abdominal pain.  Call Dr. Ambrose for follow up as needed.  Return to the ER if any acute concerns.

## 2024-03-04 ENCOUNTER — TELEPHONE (OUTPATIENT)
Dept: OBSTETRICS AND GYNECOLOGY | Facility: CLINIC | Age: 39
End: 2024-03-04
Payer: COMMERCIAL

## 2024-03-04 NOTE — TELEPHONE ENCOUNTER
Her US was overall normal and her Hcg is negative,so she is not pregnant. She can follow up in the next couple of weeks, ie 2-4.     Mellisa Ambrose MD

## 2024-03-04 NOTE — TELEPHONE ENCOUNTER
Pt is scheduled with Isha as a new pt in July, was seen in ER yesterday and is wanting to get in sooner. No availability until July with [facility] or Isha. Please advise and contact pt

## 2024-03-04 NOTE — TELEPHONE ENCOUNTER
"\"At the first appointment I think we need to focus on establishing care and getting her caught up on anything she is behind on- then we can get her back in for u/s and appt as needed. Thanks!\" -CATHY Pennington    Called and informed patient, she verified understanding.     "

## 2024-03-04 NOTE — TELEPHONE ENCOUNTER
"ED Records from yesterday: ED with Judah Qureshi MD (03/03/2024)    Records upon my glance show patient as \"pregnant\", however does not have any hCG prior to ED visit on our record, but ED Records state that patient had home UPT pregnancy that was positive 4 days ago, then started having bleeding with clots and pelvic pain and presented to ED yesterday.  ED ran hCG which was negative.    Patient saw Dr Saeed dating back to 2017, then one visit with Dr. Gallego.  No visits since then, currently scheduled with CATHY Astorga to re-establish care, however she has not seen them previously.  Spoke with clinical manager on who to send this to for review given that the former two providers are no longer here and that it needs to be sent to an MD.  She advised to send this to Dr. Ambrose for initial review at least to see what/how patient would be advised moving forward.  Routing accordingly.   "

## 2024-03-04 NOTE — TELEPHONE ENCOUNTER
Called and spoke with patient, we got her scheduled for an earlier appt on March 29 with CATHY Pennington.    Patient stated on call that she was initially trying to see us and get established to f/u right uterine leiomyoma - routing to provider to see if an ultrasound would be indicated right away prior to her newly scheduled appt date, or if this would be discussed at that appt first.